# Patient Record
Sex: FEMALE | Race: WHITE
[De-identification: names, ages, dates, MRNs, and addresses within clinical notes are randomized per-mention and may not be internally consistent; named-entity substitution may affect disease eponyms.]

---

## 2017-03-01 ENCOUNTER — HOSPITAL ENCOUNTER (OUTPATIENT)
Age: 72
End: 2017-03-01
Payer: COMMERCIAL

## 2017-03-01 DIAGNOSIS — M85.9: Primary | ICD-10-CM

## 2017-03-01 DIAGNOSIS — C50.919: ICD-10-CM

## 2017-11-29 ENCOUNTER — TELEPHONE (OUTPATIENT)
Dept: NEUROLOGY | Facility: CLINIC | Age: 72
End: 2017-11-29

## 2022-02-18 ENCOUNTER — HOSPITAL ENCOUNTER (OUTPATIENT)
Dept: CT IMAGING | Facility: HOSPITAL | Age: 77
Discharge: HOME | End: 2022-02-18
Payer: MEDICARE

## 2022-02-18 DIAGNOSIS — T85.43XS BREAST IMPLANT RUPTURE, SEQUELA: ICD-10-CM

## 2022-02-18 PROCEDURE — 71250 CT THORAX DX C-: CPT

## 2023-06-06 ENCOUNTER — APPOINTMENT (OUTPATIENT)
Dept: GENERAL RADIOLOGY | Facility: HOSPITAL | Age: 78
DRG: 193 | End: 2023-06-06
Attending: EMERGENCY MEDICINE
Payer: MEDICARE

## 2023-06-06 ENCOUNTER — HOSPITAL ENCOUNTER (INPATIENT)
Facility: HOSPITAL | Age: 78
LOS: 3 days | Discharge: NURSING, REHAB OR LONG-TERM CARE | DRG: 193 | End: 2023-06-09
Attending: EMERGENCY MEDICINE | Admitting: INTERNAL MEDICINE
Payer: MEDICARE

## 2023-06-06 DIAGNOSIS — D72.829 LEUKOCYTOSIS, UNSPECIFIED TYPE: ICD-10-CM

## 2023-06-06 DIAGNOSIS — R09.02 HYPOXIA: Primary | ICD-10-CM

## 2023-06-06 DIAGNOSIS — R09.89 PULMONARY VASCULAR CONGESTION: ICD-10-CM

## 2023-06-06 PROBLEM — I50.43 CHF (CONGESTIVE HEART FAILURE), NYHA CLASS I, ACUTE ON CHRONIC, COMBINED (HCC): Status: ACTIVE | Noted: 2023-06-06

## 2023-06-06 LAB
ALBUMIN SERPL-MCNC: 2.4 G/DL (ref 3.4–4.8)
ALBUMIN/GLOB SERPL: 0.6 {RATIO} (ref 0.8–2)
ALP SERPL-CCNC: 218 U/L (ref 25–100)
ALT SERPL-CCNC: 30 U/L (ref 4–36)
AMORPH SED URNS QL MICRO: ABNORMAL /HPF
ANION GAP SERPL CALCULATED.3IONS-SCNC: 10 MMOL/L (ref 3–16)
AST SERPL-CCNC: 35 U/L (ref 8–33)
BACTERIA URNS QL MICRO: ABNORMAL /HPF
BASE EXCESS BLDA CALC-SCNC: 7 MMOL/L (ref -3–3)
BASOPHILS # BLD: 0.1 K/UL (ref 0–0.1)
BASOPHILS NFR BLD: 0.5 %
BILIRUB SERPL-MCNC: 0.3 MG/DL (ref 0.3–1.2)
BILIRUB UR QL STRIP.AUTO: ABNORMAL
BUN SERPL-MCNC: 22 MG/DL (ref 6–20)
CALCIUM SERPL-MCNC: 9.3 MG/DL (ref 8.5–10.5)
CHLORIDE SERPL-SCNC: 96 MMOL/L (ref 98–107)
CLARITY UR: CLEAR
CO2 BLDA-SCNC: 34.2 MMOL/L (ref 24–30)
CO2 SERPL-SCNC: 28 MMOL/L (ref 20–30)
COARSE GRAN CASTS #/AREA URNS LPF: ABNORMAL /LPF (ref 0–2)
COLOR UR: YELLOW
CREAT SERPL-MCNC: 0.8 MG/DL (ref 0.4–1.2)
EOSINOPHIL # BLD: 0 K/UL (ref 0–0.4)
EOSINOPHIL NFR BLD: 0 %
EPI CELLS #/AREA URNS HPF: ABNORMAL /HPF (ref 0–5)
ERYTHROCYTE [DISTWIDTH] IN BLOOD BY AUTOMATED COUNT: 12.9 % (ref 11–16)
GFR SERPLBLD CREATININE-BSD FMLA CKD-EPI: >60 ML/MIN/{1.73_M2}
GLOBULIN SER CALC-MCNC: 4.3 G/DL
GLUCOSE SERPL-MCNC: 101 MG/DL (ref 74–106)
GLUCOSE UR STRIP.AUTO-MCNC: NEGATIVE MG/DL
HCO3 BLDA-SCNC: 32.7 MMOL/L (ref 22–26)
HCT VFR BLD AUTO: 44.8 % (ref 37–47)
HGB BLD-MCNC: 14.5 G/DL (ref 11.5–16.5)
HGB UR QL STRIP.AUTO: ABNORMAL
IMM GRANULOCYTES # BLD: 0.3 K/UL
IMM GRANULOCYTES NFR BLD: 1.3 % (ref 0–5)
INHALED O2 FLOW RATE: 0.21 %
KETONES UR STRIP.AUTO-MCNC: 15 MG/DL
LACTATE BLDV-SCNC: 2 MMOL/L (ref 0.4–2)
LEUKOCYTE ESTERASE UR QL STRIP.AUTO: NEGATIVE
LYMPHOCYTES # BLD: 1.3 K/UL (ref 1.5–4)
LYMPHOCYTES NFR BLD: 5.8 %
MCH RBC QN AUTO: 32.6 PG (ref 27–32)
MCHC RBC AUTO-ENTMCNC: 32.4 G/DL (ref 31–35)
MCV RBC AUTO: 100.7 FL (ref 80–100)
MONOCYTES # BLD: 1.5 K/UL (ref 0.2–0.8)
MONOCYTES NFR BLD: 6.8 %
MUCOUS THREADS URNS QL MICRO: ABNORMAL /LPF
NEUTROPHILS # BLD: 18.8 K/UL (ref 2–7.5)
NEUTS SEG NFR BLD: 85.6 %
NITRITE UR QL STRIP.AUTO: NEGATIVE
NT-PROBNP SERPL-MCNC: 701 PG/ML (ref 0–1800)
O2 THERAPY: ABNORMAL
PCO2 BLDA: 50.4 MMHG (ref 35–45)
PH BLDA: 7.43 [PH] (ref 7.35–7.45)
PH UR STRIP.AUTO: 5.5 [PH] (ref 5–8)
PLATELET # BLD AUTO: 222 K/UL (ref 150–400)
PMV BLD AUTO: 9.9 FL (ref 6–10)
PO2 BLDA: 49.8 MMHG (ref 80–100)
POTASSIUM SERPL-SCNC: 4.6 MMOL/L (ref 3.4–5.1)
PROT SERPL-MCNC: 6.7 G/DL (ref 6.4–8.3)
PROT UR STRIP.AUTO-MCNC: 100 MG/DL
RBC # BLD AUTO: 4.45 M/UL (ref 3.8–5.8)
RBC #/AREA URNS HPF: ABNORMAL /HPF (ref 0–4)
SAO2 % BLDA: 82.4 %
SODIUM SERPL-SCNC: 134 MMOL/L (ref 136–145)
SP GR UR STRIP.AUTO: >=1.03 (ref 1–1.03)
T4 FREE SERPL-MCNC: 0.8 NG/DL (ref 0.89–1.76)
TSH SERPL-MCNC: 8.16 UIU/ML (ref 0.27–4.2)
UA COMPLETE W REFLEX CULTURE PNL UR: ABNORMAL
UA DIPSTICK W REFLEX MICRO PNL UR: YES
URN SPEC COLLECT METH UR: ABNORMAL
UROBILINOGEN UR STRIP-ACNC: 2 E.U./DL
WBC # BLD AUTO: 21.9 K/UL (ref 4–11)
WBC #/AREA URNS HPF: ABNORMAL /HPF (ref 0–5)

## 2023-06-06 PROCEDURE — 93005 ELECTROCARDIOGRAM TRACING: CPT

## 2023-06-06 PROCEDURE — 84443 ASSAY THYROID STIM HORMONE: CPT

## 2023-06-06 PROCEDURE — 80053 COMPREHEN METABOLIC PANEL: CPT

## 2023-06-06 PROCEDURE — 81001 URINALYSIS AUTO W/SCOPE: CPT

## 2023-06-06 PROCEDURE — 83605 ASSAY OF LACTIC ACID: CPT

## 2023-06-06 PROCEDURE — 87040 BLOOD CULTURE FOR BACTERIA: CPT

## 2023-06-06 PROCEDURE — 6360000002 HC RX W HCPCS: Performed by: FAMILY MEDICINE

## 2023-06-06 PROCEDURE — 82803 BLOOD GASES ANY COMBINATION: CPT

## 2023-06-06 PROCEDURE — 6370000000 HC RX 637 (ALT 250 FOR IP): Performed by: EMERGENCY MEDICINE

## 2023-06-06 PROCEDURE — 71045 X-RAY EXAM CHEST 1 VIEW: CPT

## 2023-06-06 PROCEDURE — 1200000000 HC SEMI PRIVATE

## 2023-06-06 PROCEDURE — 85025 COMPLETE CBC W/AUTO DIFF WBC: CPT

## 2023-06-06 PROCEDURE — 51701 INSERT BLADDER CATHETER: CPT

## 2023-06-06 PROCEDURE — 36600 WITHDRAWAL OF ARTERIAL BLOOD: CPT

## 2023-06-06 PROCEDURE — 36415 COLL VENOUS BLD VENIPUNCTURE: CPT

## 2023-06-06 PROCEDURE — 83880 ASSAY OF NATRIURETIC PEPTIDE: CPT

## 2023-06-06 PROCEDURE — 84439 ASSAY OF FREE THYROXINE: CPT

## 2023-06-06 PROCEDURE — 96374 THER/PROPH/DIAG INJ IV PUSH: CPT

## 2023-06-06 PROCEDURE — 99285 EMERGENCY DEPT VISIT HI MDM: CPT

## 2023-06-06 PROCEDURE — 94640 AIRWAY INHALATION TREATMENT: CPT

## 2023-06-06 RX ORDER — ENOXAPARIN SODIUM 100 MG/ML
40 INJECTION SUBCUTANEOUS DAILY
Status: DISCONTINUED | OUTPATIENT
Start: 2023-06-07 | End: 2023-06-09 | Stop reason: HOSPADM

## 2023-06-06 RX ORDER — DONEPEZIL HYDROCHLORIDE 5 MG/1
10 TABLET, FILM COATED ORAL NIGHTLY
Status: DISCONTINUED | OUTPATIENT
Start: 2023-06-07 | End: 2023-06-09 | Stop reason: HOSPADM

## 2023-06-06 RX ORDER — IPRATROPIUM BROMIDE AND ALBUTEROL SULFATE 2.5; .5 MG/3ML; MG/3ML
1 SOLUTION RESPIRATORY (INHALATION) ONCE
Status: COMPLETED | OUTPATIENT
Start: 2023-06-06 | End: 2023-06-06

## 2023-06-06 RX ORDER — FLUOXETINE HYDROCHLORIDE 40 MG/1
40 CAPSULE ORAL DAILY
COMMUNITY

## 2023-06-06 RX ORDER — DONEPEZIL HYDROCHLORIDE 10 MG/1
10 TABLET, FILM COATED ORAL NIGHTLY
COMMUNITY

## 2023-06-06 RX ORDER — POLYETHYLENE GLYCOL 3350 17 G/17G
17 POWDER, FOR SOLUTION ORAL DAILY
COMMUNITY

## 2023-06-06 RX ORDER — SODIUM CHLORIDE 9 MG/ML
INJECTION, SOLUTION INTRAVENOUS PRN
Status: DISCONTINUED | OUTPATIENT
Start: 2023-06-06 | End: 2023-06-09 | Stop reason: HOSPADM

## 2023-06-06 RX ORDER — FUROSEMIDE 10 MG/ML
20 INJECTION INTRAMUSCULAR; INTRAVENOUS ONCE
Status: COMPLETED | OUTPATIENT
Start: 2023-06-06 | End: 2023-06-06

## 2023-06-06 RX ORDER — DIVALPROEX SODIUM 500 MG/1
500 TABLET, DELAYED RELEASE ORAL 2 TIMES DAILY
Status: DISCONTINUED | OUTPATIENT
Start: 2023-06-07 | End: 2023-06-07

## 2023-06-06 RX ORDER — FLUOXETINE HYDROCHLORIDE 20 MG/1
40 CAPSULE ORAL DAILY
Status: DISCONTINUED | OUTPATIENT
Start: 2023-06-07 | End: 2023-06-09 | Stop reason: HOSPADM

## 2023-06-06 RX ORDER — FERROUS SULFATE 325(65) MG
325 TABLET ORAL 2 TIMES DAILY
COMMUNITY

## 2023-06-06 RX ORDER — ACETAMINOPHEN 325 MG/1
650 TABLET ORAL EVERY 6 HOURS PRN
Status: DISCONTINUED | OUTPATIENT
Start: 2023-06-06 | End: 2023-06-07 | Stop reason: SDUPTHER

## 2023-06-06 RX ORDER — ACETAMINOPHEN 650 MG/1
650 SUPPOSITORY RECTAL EVERY 6 HOURS PRN
Status: DISCONTINUED | OUTPATIENT
Start: 2023-06-06 | End: 2023-06-09 | Stop reason: HOSPADM

## 2023-06-06 RX ORDER — ONDANSETRON 2 MG/ML
4 INJECTION INTRAMUSCULAR; INTRAVENOUS EVERY 6 HOURS PRN
Status: DISCONTINUED | OUTPATIENT
Start: 2023-06-06 | End: 2023-06-09 | Stop reason: HOSPADM

## 2023-06-06 RX ORDER — FLUTICASONE PROPIONATE 50 MCG
1 SPRAY, SUSPENSION (ML) NASAL DAILY
Status: DISCONTINUED | OUTPATIENT
Start: 2023-06-07 | End: 2023-06-09 | Stop reason: HOSPADM

## 2023-06-06 RX ORDER — ROSUVASTATIN CALCIUM 20 MG/1
20 TABLET, COATED ORAL DAILY
Status: DISCONTINUED | OUTPATIENT
Start: 2023-06-07 | End: 2023-06-09 | Stop reason: HOSPADM

## 2023-06-06 RX ORDER — MEMANTINE HYDROCHLORIDE 10 MG/1
10 TABLET ORAL 2 TIMES DAILY
COMMUNITY

## 2023-06-06 RX ORDER — LEVOTHYROXINE SODIUM 0.05 MG/1
50 TABLET ORAL DAILY
Status: ON HOLD | COMMUNITY
End: 2023-06-09 | Stop reason: SDUPTHER

## 2023-06-06 RX ORDER — POLYETHYLENE GLYCOL 3350 17 G/17G
17 POWDER, FOR SOLUTION ORAL DAILY
Status: DISCONTINUED | OUTPATIENT
Start: 2023-06-07 | End: 2023-06-09 | Stop reason: HOSPADM

## 2023-06-06 RX ORDER — FERROUS SULFATE TAB EC 324 MG (65 MG FE EQUIVALENT) 324 (65 FE) MG
324 TABLET DELAYED RESPONSE ORAL 2 TIMES DAILY
Status: DISCONTINUED | OUTPATIENT
Start: 2023-06-07 | End: 2023-06-07 | Stop reason: CLARIF

## 2023-06-06 RX ORDER — POLYETHYLENE GLYCOL 3350 17 G/17G
17 POWDER, FOR SOLUTION ORAL DAILY PRN
Status: DISCONTINUED | OUTPATIENT
Start: 2023-06-06 | End: 2023-06-09 | Stop reason: HOSPADM

## 2023-06-06 RX ORDER — VITAMIN B COMPLEX
1000 TABLET ORAL DAILY
Status: DISCONTINUED | OUTPATIENT
Start: 2023-06-07 | End: 2023-06-09 | Stop reason: HOSPADM

## 2023-06-06 RX ORDER — M-VIT,TX,IRON,MINS/CALC/FOLIC 27MG-0.4MG
1 TABLET ORAL DAILY
Status: DISCONTINUED | OUTPATIENT
Start: 2023-06-07 | End: 2023-06-07 | Stop reason: CLARIF

## 2023-06-06 RX ORDER — ACETAMINOPHEN 325 MG/1
650 TABLET ORAL EVERY 6 HOURS PRN
COMMUNITY

## 2023-06-06 RX ORDER — FLUTICASONE PROPIONATE 50 MCG
1 SPRAY, SUSPENSION (ML) NASAL NIGHTLY
COMMUNITY

## 2023-06-06 RX ORDER — ONDANSETRON 4 MG/1
4 TABLET, ORALLY DISINTEGRATING ORAL EVERY 8 HOURS PRN
Status: DISCONTINUED | OUTPATIENT
Start: 2023-06-06 | End: 2023-06-09 | Stop reason: HOSPADM

## 2023-06-06 RX ORDER — SODIUM CHLORIDE 0.9 % (FLUSH) 0.9 %
5-40 SYRINGE (ML) INJECTION EVERY 12 HOURS SCHEDULED
Status: DISCONTINUED | OUTPATIENT
Start: 2023-06-07 | End: 2023-06-09 | Stop reason: HOSPADM

## 2023-06-06 RX ORDER — AZITHROMYCIN 250 MG/1
500 TABLET, FILM COATED ORAL EVERY 24 HOURS
Status: COMPLETED | OUTPATIENT
Start: 2023-06-06 | End: 2023-06-09

## 2023-06-06 RX ORDER — PANTOPRAZOLE SODIUM 40 MG/1
40 TABLET, DELAYED RELEASE ORAL DAILY
Status: DISCONTINUED | OUTPATIENT
Start: 2023-06-07 | End: 2023-06-07 | Stop reason: CLARIF

## 2023-06-06 RX ORDER — LEVOTHYROXINE SODIUM 0.07 MG/1
75 TABLET ORAL DAILY
Status: DISCONTINUED | OUTPATIENT
Start: 2023-06-07 | End: 2023-06-09 | Stop reason: HOSPADM

## 2023-06-06 RX ORDER — SODIUM CHLORIDE 0.9 % (FLUSH) 0.9 %
5-40 SYRINGE (ML) INJECTION PRN
Status: DISCONTINUED | OUTPATIENT
Start: 2023-06-06 | End: 2023-06-09 | Stop reason: HOSPADM

## 2023-06-06 RX ORDER — ACETAMINOPHEN 325 MG/1
650 TABLET ORAL EVERY 6 HOURS PRN
Status: DISCONTINUED | OUTPATIENT
Start: 2023-06-06 | End: 2023-06-09 | Stop reason: HOSPADM

## 2023-06-06 RX ORDER — MEMANTINE HYDROCHLORIDE 5 MG/1
10 TABLET ORAL 2 TIMES DAILY
Status: DISCONTINUED | OUTPATIENT
Start: 2023-06-07 | End: 2023-06-09 | Stop reason: HOSPADM

## 2023-06-06 RX ORDER — PANTOPRAZOLE SODIUM 40 MG/1
40 TABLET, DELAYED RELEASE ORAL DAILY
COMMUNITY

## 2023-06-06 RX ORDER — DIVALPROEX SODIUM 500 MG/1
500 TABLET, DELAYED RELEASE ORAL 2 TIMES DAILY
COMMUNITY

## 2023-06-06 RX ADMIN — FUROSEMIDE 20 MG: 10 INJECTION, SOLUTION INTRAMUSCULAR; INTRAVENOUS at 20:34

## 2023-06-06 RX ADMIN — IPRATROPIUM BROMIDE AND ALBUTEROL SULFATE 1 DOSE: .5; 3 SOLUTION RESPIRATORY (INHALATION) at 18:55

## 2023-06-06 ASSESSMENT — PAIN - FUNCTIONAL ASSESSMENT: PAIN_FUNCTIONAL_ASSESSMENT: NONE - DENIES PAIN

## 2023-06-06 NOTE — ED NOTES
Upon assessment for IV access. Pt noted to have numerous puncture wounds with bruising appear to me anatomically over vein sites in UE, that were present on arrival. >14 site.        Beth Elliott RN  06/06/23 1962

## 2023-06-06 NOTE — ED TRIAGE NOTES
Pt arrive via [facility], from [facility] for [facility]. Pt is alert upon arrival, pleasantly confused, history of dementia, usually able to do things mostly on her own per reports. Has had a decline and increase weakness x2-3 days. Report from NH, advised O2 this AM was 82% RA, was placed on 2LNC, and later in day had to increase to 3L to maintain 90%.  BP 97/53

## 2023-06-06 NOTE — ED PROVIDER NOTES
Yulissa Avitia [address]      Pt Name: Tina Reeder  MRN: [id number]  [place]: 1945  Date of evaluation: 6/6/2023  Provider: Hung Pierson MD    [address] [place] [address]       Chief Complaint   Patient presents with    Altered Mental Status         HISTORY OF PRESENT ILLNESS  (Location/Symptom, Timing/Onset, Context/Setting, Quality, Duration, Modifying Factors, Severity.)   Tina Redeer is a 69 y.o. female who presents to the emergency department patient is alert oriented only to self history per the triage nurse the patient was sent from [address] [place] [address] with a state over the last 3 to 4 days she has gotten more more lethargic with decreased functioning and said normally she is disoriented but she is able to get up move around and function and she is been doing that less and less they also noted that her oxygen saturation has been down to 89% she is normally on 2 L nasal cannula oxygen they had to turn it up to 3. Nursing notes were reviewed. REVIEW OFSYSTEMS    (2-9 systems for level 4, 10 or more for level 5)   ROS:  Unobtainable secondary to patient's mental status    PAST MEDICAL HISTORY     Past Medical History:   Diagnosis Date    Dementia ([facility] [address]     Depression     GERD (gastroesophageal reflux disease)     Hyperlipidemia     Hypertension     Thyroid disease          SURGICAL HISTORY     History reviewed. No pertinent surgical history.       CURRENT MEDICATIONS       Previous Medications    ACETAMINOPHEN (TYLENOL) 325 MG TABLET    Take 2 tablets by mouth every 6 hours as needed for Pain    APOAEQUORIN (PREVAGEN) 10 MG CAPS    Take by mouth    DIVALPROEX (DEPAKOTE) 500 MG DR TABLET    Take 1 tablet by mouth in the morning and at bedtime    DONEPEZIL (ARICEPT) 10 MG TABLET    1 tablet nightly    FERROUS SULFATE (IRON 325) 325 (65 FE) MG TABLET    Take 1 tablet by mouth in the morning and at bedtime    FLUOXETINE (PROZAC) 40 MG

## 2023-06-06 NOTE — ED NOTES
Attempted to draw add on labs on this patient, stuck her twice and was not able to get more than a flash. Reported this to the Nurse and asked Varsha PCA to try.      Elyse Vincent  06/06/23 1916

## 2023-06-07 ENCOUNTER — APPOINTMENT (OUTPATIENT)
Dept: CT IMAGING | Facility: HOSPITAL | Age: 78
DRG: 193 | End: 2023-06-07
Payer: MEDICARE

## 2023-06-07 ENCOUNTER — OUTSIDE FACILITY SERVICE (OUTPATIENT)
Dept: CARDIOLOGY | Facility: CLINIC | Age: 78
End: 2023-06-07
Payer: MEDICARE

## 2023-06-07 PROBLEM — J96.01 ACUTE RESPIRATORY FAILURE WITH HYPOXIA AND HYPERCAPNIA (HCC): Status: ACTIVE | Noted: 2023-06-07

## 2023-06-07 PROBLEM — J96.02 ACUTE RESPIRATORY FAILURE WITH HYPOXIA AND HYPERCAPNIA (HCC): Status: ACTIVE | Noted: 2023-06-07

## 2023-06-07 PROBLEM — R13.10 DYSPHAGIA: Status: ACTIVE | Noted: 2023-06-07

## 2023-06-07 PROBLEM — J18.9 PNEUMONIA: Status: ACTIVE | Noted: 2023-06-07

## 2023-06-07 LAB
ANION GAP SERPL CALCULATED.3IONS-SCNC: 10 MMOL/L (ref 3–16)
BASE EXCESS BLDA CALC-SCNC: 9.5 MMOL/L (ref -3–3)
BASOPHILS # BLD: 0.1 K/UL (ref 0–0.1)
BASOPHILS NFR BLD: 0.4 %
BUN SERPL-MCNC: 20 MG/DL (ref 6–20)
CALCIUM SERPL-MCNC: 9.1 MG/DL (ref 8.5–10.5)
CHLORIDE SERPL-SCNC: 94 MMOL/L (ref 98–107)
CK SERPL-CCNC: 77 U/L (ref 26–174)
CO2 BLDA-SCNC: 36.8 MMOL/L (ref 24–30)
CO2 SERPL-SCNC: 33 MMOL/L (ref 20–30)
CREAT SERPL-MCNC: 0.7 MG/DL (ref 0.4–1.2)
EOSINOPHIL # BLD: 0 K/UL (ref 0–0.4)
EOSINOPHIL NFR BLD: 0 %
ERYTHROCYTE [DISTWIDTH] IN BLOOD BY AUTOMATED COUNT: 12.7 % (ref 11–16)
FOLATE SERPL-MCNC: 16.73 NG/ML
GFR SERPLBLD CREATININE-BSD FMLA CKD-EPI: >60 ML/MIN/{1.73_M2}
GLUCOSE SERPL-MCNC: 97 MG/DL (ref 74–106)
HCO3 BLDA-SCNC: 35.2 MMOL/L (ref 22–26)
HCT VFR BLD AUTO: 38.8 % (ref 37–47)
HGB BLD-MCNC: 12.6 G/DL (ref 11.5–16.5)
IMM GRANULOCYTES # BLD: 0.3 K/UL
IMM GRANULOCYTES NFR BLD: 1.3 % (ref 0–5)
INHALED O2 FLOW RATE: 0.28 %
LV EF: 58 %
LVEF MODALITY: NORMAL
LYMPHOCYTES # BLD: 1.4 K/UL (ref 1.5–4)
LYMPHOCYTES NFR BLD: 5.8 %
MAGNESIUM SERPL-MCNC: 1.7 MG/DL (ref 1.7–2.4)
MCH RBC QN AUTO: 32.1 PG (ref 27–32)
MCHC RBC AUTO-ENTMCNC: 32.5 G/DL (ref 31–35)
MCV RBC AUTO: 98.7 FL (ref 80–100)
MONOCYTES # BLD: 1.6 K/UL (ref 0.2–0.8)
MONOCYTES NFR BLD: 6.8 %
NEUTROPHILS # BLD: 20.7 K/UL (ref 2–7.5)
NEUTS SEG NFR BLD: 85.7 %
O2 THERAPY: ABNORMAL
PCO2 BLDA: 51 MMHG (ref 35–45)
PH BLDA: 7.46 [PH] (ref 7.35–7.45)
PLATELET # BLD AUTO: 208 K/UL (ref 150–400)
PMV BLD AUTO: 10.3 FL (ref 6–10)
PO2 BLDA: 68.8 MMHG (ref 80–100)
POTASSIUM SERPL-SCNC: 2.8 MMOL/L (ref 3.4–5.1)
PROCALCITONIN SERPL IA-MCNC: 0.2 NG/ML (ref 0–0.15)
RBC # BLD AUTO: 3.93 M/UL (ref 3.8–5.8)
SAO2 % BLDA: 93.5 %
SODIUM SERPL-SCNC: 137 MMOL/L (ref 136–145)
VIT B12 SERPL-MCNC: >2000 PG/ML (ref 211–911)
WBC # BLD AUTO: 24.1 K/UL (ref 4–11)

## 2023-06-07 PROCEDURE — 1200000000 HC SEMI PRIVATE

## 2023-06-07 PROCEDURE — 2580000003 HC RX 258: Performed by: INTERNAL MEDICINE

## 2023-06-07 PROCEDURE — 6370000000 HC RX 637 (ALT 250 FOR IP): Performed by: PHYSICIAN ASSISTANT

## 2023-06-07 PROCEDURE — 6360000002 HC RX W HCPCS: Performed by: PHYSICIAN ASSISTANT

## 2023-06-07 PROCEDURE — 93306 TTE W/DOPPLER COMPLETE: CPT

## 2023-06-07 PROCEDURE — 97802 MEDICAL NUTRITION INDIV IN: CPT

## 2023-06-07 PROCEDURE — 84145 PROCALCITONIN (PCT): CPT

## 2023-06-07 PROCEDURE — 6370000000 HC RX 637 (ALT 250 FOR IP): Performed by: INTERNAL MEDICINE

## 2023-06-07 PROCEDURE — 85025 COMPLETE CBC W/AUTO DIFF WBC: CPT

## 2023-06-07 PROCEDURE — 97530 THERAPEUTIC ACTIVITIES: CPT

## 2023-06-07 PROCEDURE — 92610 EVALUATE SWALLOWING FUNCTION: CPT

## 2023-06-07 PROCEDURE — 82550 ASSAY OF CK (CPK): CPT

## 2023-06-07 PROCEDURE — 97165 OT EVAL LOW COMPLEX 30 MIN: CPT

## 2023-06-07 PROCEDURE — 82803 BLOOD GASES ANY COMBINATION: CPT

## 2023-06-07 PROCEDURE — 71250 CT THORAX DX C-: CPT

## 2023-06-07 PROCEDURE — 82607 VITAMIN B-12: CPT

## 2023-06-07 PROCEDURE — 97161 PT EVAL LOW COMPLEX 20 MIN: CPT

## 2023-06-07 PROCEDURE — 83735 ASSAY OF MAGNESIUM: CPT

## 2023-06-07 PROCEDURE — 70450 CT HEAD/BRAIN W/O DYE: CPT

## 2023-06-07 PROCEDURE — 82746 ASSAY OF FOLIC ACID SERUM: CPT

## 2023-06-07 PROCEDURE — 6360000002 HC RX W HCPCS: Performed by: INTERNAL MEDICINE

## 2023-06-07 PROCEDURE — 36600 WITHDRAWAL OF ARTERIAL BLOOD: CPT

## 2023-06-07 PROCEDURE — 80048 BASIC METABOLIC PNL TOTAL CA: CPT

## 2023-06-07 PROCEDURE — 2700000000 HC OXYGEN THERAPY PER DAY

## 2023-06-07 PROCEDURE — 2580000003 HC RX 258: Performed by: PHYSICIAN ASSISTANT

## 2023-06-07 PROCEDURE — 99222 1ST HOSP IP/OBS MODERATE 55: CPT | Performed by: PHYSICIAN ASSISTANT

## 2023-06-07 PROCEDURE — 94640 AIRWAY INHALATION TREATMENT: CPT

## 2023-06-07 PROCEDURE — 36415 COLL VENOUS BLD VENIPUNCTURE: CPT

## 2023-06-07 RX ORDER — GUAIFENESIN 200 MG/10ML
200 LIQUID ORAL EVERY 6 HOURS PRN
Status: ON HOLD | COMMUNITY
End: 2023-06-09 | Stop reason: HOSPADM

## 2023-06-07 RX ORDER — DIVALPROEX SODIUM 125 MG/1
500 CAPSULE, COATED PELLETS ORAL EVERY 12 HOURS SCHEDULED
Status: DISCONTINUED | OUTPATIENT
Start: 2023-06-07 | End: 2023-06-07

## 2023-06-07 RX ORDER — POLYMYXIN B SULFATE AND TRIMETHOPRIM 1; 10000 MG/ML; [USP'U]/ML
1 SOLUTION OPHTHALMIC
Status: DISCONTINUED | OUTPATIENT
Start: 2023-06-07 | End: 2023-06-07 | Stop reason: ALTCHOICE

## 2023-06-07 RX ORDER — DIVALPROEX SODIUM 125 MG/1
500 CAPSULE, COATED PELLETS ORAL EVERY 12 HOURS SCHEDULED
Status: DISCONTINUED | OUTPATIENT
Start: 2023-06-07 | End: 2023-06-09 | Stop reason: HOSPADM

## 2023-06-07 RX ORDER — POTASSIUM CHLORIDE 20 MEQ/1
40 TABLET, EXTENDED RELEASE ORAL ONCE
Status: COMPLETED | OUTPATIENT
Start: 2023-06-07 | End: 2023-06-07

## 2023-06-07 RX ORDER — POTASSIUM CHLORIDE 20 MEQ/1
40 TABLET, EXTENDED RELEASE ORAL ONCE
Status: DISCONTINUED | OUTPATIENT
Start: 2023-06-07 | End: 2023-06-07 | Stop reason: CLARIF

## 2023-06-07 RX ORDER — LANSOPRAZOLE 30 MG/1
30 TABLET, ORALLY DISINTEGRATING, DELAYED RELEASE ORAL
Status: DISCONTINUED | OUTPATIENT
Start: 2023-06-08 | End: 2023-06-09 | Stop reason: HOSPADM

## 2023-06-07 RX ORDER — FUROSEMIDE 20 MG/1
20 TABLET ORAL ONCE
Status: COMPLETED | OUTPATIENT
Start: 2023-06-07 | End: 2023-06-07

## 2023-06-07 RX ORDER — FERROUS SULFATE 300 MG/5ML
300 LIQUID (ML) ORAL DAILY
Status: DISCONTINUED | OUTPATIENT
Start: 2023-06-08 | End: 2023-06-08

## 2023-06-07 RX ORDER — MAGNESIUM SULFATE 1 G/100ML
1000 INJECTION INTRAVENOUS ONCE
Status: COMPLETED | OUTPATIENT
Start: 2023-06-07 | End: 2023-06-07

## 2023-06-07 RX ORDER — POTASSIUM CHLORIDE 7.45 MG/ML
10 INJECTION INTRAVENOUS
Status: COMPLETED | OUTPATIENT
Start: 2023-06-07 | End: 2023-06-07

## 2023-06-07 RX ORDER — GENTAMICIN SULFATE 3 MG/ML
1 SOLUTION/ DROPS OPHTHALMIC
Status: DISCONTINUED | OUTPATIENT
Start: 2023-06-07 | End: 2023-06-09 | Stop reason: HOSPADM

## 2023-06-07 RX ORDER — IPRATROPIUM BROMIDE AND ALBUTEROL SULFATE 2.5; .5 MG/3ML; MG/3ML
1 SOLUTION RESPIRATORY (INHALATION)
Status: DISCONTINUED | OUTPATIENT
Start: 2023-06-07 | End: 2023-06-08

## 2023-06-07 RX ORDER — FUROSEMIDE 10 MG/ML
20 INJECTION INTRAMUSCULAR; INTRAVENOUS ONCE
Status: DISCONTINUED | OUTPATIENT
Start: 2023-06-07 | End: 2023-06-07

## 2023-06-07 RX ORDER — MULTIVIT-MIN/FERROUS GLUCONATE 9 MG/15 ML
15 LIQUID (ML) ORAL DAILY
Status: DISCONTINUED | OUTPATIENT
Start: 2023-06-08 | End: 2023-06-09 | Stop reason: HOSPADM

## 2023-06-07 RX ORDER — POTASSIUM CHLORIDE 750 MG/1
40 CAPSULE, EXTENDED RELEASE ORAL ONCE
Status: COMPLETED | OUTPATIENT
Start: 2023-06-07 | End: 2023-06-07

## 2023-06-07 RX ADMIN — ENOXAPARIN SODIUM 40 MG: 100 INJECTION SUBCUTANEOUS at 08:37

## 2023-06-07 RX ADMIN — POTASSIUM CHLORIDE 10 MEQ: 7.46 INJECTION, SOLUTION INTRAVENOUS at 08:47

## 2023-06-07 RX ADMIN — MEMANTINE HYDROCHLORIDE 10 MG: 5 TABLET, FILM COATED ORAL at 08:36

## 2023-06-07 RX ADMIN — SODIUM CHLORIDE: 9 INJECTION, SOLUTION INTRAVENOUS at 00:45

## 2023-06-07 RX ADMIN — POLYETHYLENE GLYCOL 3350 17 G: 17 POWDER, FOR SOLUTION ORAL at 08:35

## 2023-06-07 RX ADMIN — FLUOXETINE 40 MG: 20 CAPSULE ORAL at 08:36

## 2023-06-07 RX ADMIN — POTASSIUM CHLORIDE 40 MEQ: 1500 TABLET, EXTENDED RELEASE ORAL at 06:59

## 2023-06-07 RX ADMIN — POTASSIUM CHLORIDE 40 MEQ: 10 CAPSULE, COATED, EXTENDED RELEASE ORAL at 12:49

## 2023-06-07 RX ADMIN — POTASSIUM CHLORIDE 10 MEQ: 7.46 INJECTION, SOLUTION INTRAVENOUS at 06:58

## 2023-06-07 RX ADMIN — CEFTRIAXONE 1000 MG: 1 INJECTION, POWDER, FOR SOLUTION INTRAMUSCULAR; INTRAVENOUS at 00:47

## 2023-06-07 RX ADMIN — IPRATROPIUM BROMIDE AND ALBUTEROL SULFATE 1 DOSE: .5; 3 SOLUTION RESPIRATORY (INHALATION) at 16:06

## 2023-06-07 RX ADMIN — PANTOPRAZOLE SODIUM 40 MG: 40 TABLET, DELAYED RELEASE ORAL at 08:36

## 2023-06-07 RX ADMIN — MEMANTINE HYDROCHLORIDE 10 MG: 5 TABLET, FILM COATED ORAL at 19:43

## 2023-06-07 RX ADMIN — LEVOTHYROXINE SODIUM 75 MCG: 0.07 TABLET ORAL at 06:59

## 2023-06-07 RX ADMIN — PIPERACILLIN AND TAZOBACTAM 3375 MG: 3; .375 INJECTION, POWDER, FOR SOLUTION INTRAVENOUS at 14:57

## 2023-06-07 RX ADMIN — AZITHROMYCIN MONOHYDRATE 500 MG: 250 TABLET ORAL at 23:29

## 2023-06-07 RX ADMIN — FUROSEMIDE 20 MG: 20 TABLET ORAL at 14:52

## 2023-06-07 RX ADMIN — PIPERACILLIN AND TAZOBACTAM 3375 MG: 3; .375 INJECTION, POWDER, FOR SOLUTION INTRAVENOUS at 21:25

## 2023-06-07 RX ADMIN — FLUTICASONE PROPIONATE 1 SPRAY: 50 SPRAY, METERED NASAL at 08:35

## 2023-06-07 RX ADMIN — GENTAMICIN SULFATE 1 DROP: 3 SOLUTION OPHTHALMIC at 14:08

## 2023-06-07 RX ADMIN — MEMANTINE HYDROCHLORIDE 10 MG: 5 TABLET, FILM COATED ORAL at 00:49

## 2023-06-07 RX ADMIN — Medication 1000 UNITS: at 08:36

## 2023-06-07 RX ADMIN — MULTIPLE VITAMINS W/ MINERALS TAB 1 TABLET: TAB at 08:35

## 2023-06-07 RX ADMIN — DONEPEZIL HYDROCHLORIDE 10 MG: 5 TABLET, FILM COATED ORAL at 00:49

## 2023-06-07 RX ADMIN — FERROUS SULFATE TAB EC 324 MG (65 MG FE EQUIVALENT) 324 MG: 324 (65 FE) TABLET DELAYED RESPONSE at 00:49

## 2023-06-07 RX ADMIN — DIVALPROEX SODIUM 500 MG: 125 CAPSULE, COATED PELLETS ORAL at 10:16

## 2023-06-07 RX ADMIN — DIVALPROEX SODIUM 500 MG: 125 CAPSULE, COATED PELLETS ORAL at 19:43

## 2023-06-07 RX ADMIN — AZITHROMYCIN MONOHYDRATE 500 MG: 250 TABLET ORAL at 00:48

## 2023-06-07 RX ADMIN — DONEPEZIL HYDROCHLORIDE 10 MG: 5 TABLET, FILM COATED ORAL at 19:43

## 2023-06-07 RX ADMIN — FERROUS SULFATE TAB EC 324 MG (65 MG FE EQUIVALENT) 324 MG: 324 (65 FE) TABLET DELAYED RESPONSE at 08:36

## 2023-06-07 RX ADMIN — DIVALPROEX SODIUM 500 MG: 500 TABLET, DELAYED RELEASE ORAL at 00:49

## 2023-06-07 RX ADMIN — GENTAMICIN SULFATE 1 DROP: 3 SOLUTION OPHTHALMIC at 23:29

## 2023-06-07 RX ADMIN — MAGNESIUM SULFATE HEPTAHYDRATE 1000 MG: 1 INJECTION, SOLUTION INTRAVENOUS at 10:17

## 2023-06-07 RX ADMIN — SODIUM CHLORIDE, PRESERVATIVE FREE 10 ML: 5 INJECTION INTRAVENOUS at 19:44

## 2023-06-07 RX ADMIN — ROSUVASTATIN CALCIUM 20 MG: 20 TABLET, FILM COATED ORAL at 08:36

## 2023-06-07 RX ADMIN — GENTAMICIN SULFATE 1 DROP: 3 SOLUTION OPHTHALMIC at 19:51

## 2023-06-07 NOTE — ED NOTES
Spoke with patient at this time to get permission to speak with her son Mirian Fraser at this time as he called for an update     [facility]  06/06/23 7015

## 2023-06-07 NOTE — CARE COORDINATION
Spoke with Ruchi MEJIA at [facility]. States that patient was started on 2 Liters of oxygen continuous on 6/4/2023 following a fall at their facility. States that prior to this patient was mobile and walked around the facility. Also states that patient was alert to self but had been showing increasing confusion prior to the fall, states that patient had been staying in her room more than normal and had become more angry.

## 2023-06-07 NOTE — H&P
[R13.10]  Diet and meds crushed per slp records. Aspiration precautions. 06/07/2023    Dementia ([facility] [address] [F03.90]  Per nursing home at baseline she is oriented to self. Long term resident at [facility]. Continue current regimen. SLP following. Altered mental status [R41.82]  CT head negative. Ph normal on abg. Ck pending. UA negative. Dementia at baseline. Suspect confusion may be related to acute infectious process. Hypothyroidism [E03.9]  Tsh high and synthroid increased to 75 daily. Check tsh in 4-6 weeks. Pleural effusion [J90]  Pleural effusion noted on CXR. No known history of CHF. Hypoxic. BNP around 7000. ECHO pending and prelim report with normal EF. Diuresed with lasix as BP tolerates. CT chest with small left pleural effusion. Wean o2 as tolerated. Monitor fluid balance. Hypokalemia [E87.6]  Monitor and replace as needed      Leukocytosis [D72.829]  Wbc elevated. CT chest obtained with hypoxia, history of breast ca, worsening leukocytosis and no evidence of malignancy but suspicious for RUL infectious process and on antibiotics as below. History of breast cancer [Z85    Pneumonia    CT chest with evidence of infection tree in bud opacities RUL and consolidation concnerning for infectious process. Initially on rocephin and zithromax. With worsening leukocytosis and concern for aspiration dc'd rocephin 6/7 and started zosyn. SLP following. Ellis. Wean o2 as tolerated. Patient was seen and examined with Dr. Cynthia Harden.     KACEY Luz certifies per CMS regulation for 42 CFR [phone]), that the patient may reasonably be expected to be discharged or transferred to a hospital within 96 hours after admission to [address] [place] [address]    Electronically signed by KACEY Luz on 6/7/2023 at 2:23 PM

## 2023-06-07 NOTE — ACP (ADVANCE CARE PLANNING)
Advance Care Planning     General Advance Care Planning (ACP) Conversation    Date of Conversation: 6/7/2023  Conducted with: Patient with Decision Making Capacity    Healthcare Decision Maker:    Primary Decision Maker: Arben Galeano - [phone]  Click here to complete Healthcare Decision Makers including selection of the Healthcare Decision Maker Relationship (ie \"Primary\"). Today we documented Decision Maker(s) consistent with Legal Next of Kin hierarchy.     Content/Action Overview:  Has NO ACP documents/care preferences - information provided, considering goals and options  Reviewed DNR/DNI and patient elects Full Code (Attempt Resuscitation)  artificial nutrition, ventilation preferences, and resuscitation preferences      Length of Voluntary ACP Conversation in minutes:  <16 minutes (Non-Billable)    Leila Townsend

## 2023-06-07 NOTE — CONSULTS
Meds reviewed as pt needs to have meds crushed per provider. Pantoprazole tablet changed to lansoprazole disintegrating tab, multivitamin changed to liquid multivitamin, and depaktote tabs changed to depakote sprinkle capsules. Oral enteric coated iron is currently being help per provider.     Haris Zimmer, EllaD
Requirements Based On: Kcal/kg  Weight Used for Energy Requirements: Current  Energy (kcal/day): 8180-0660 (27-29 kcal/kg)  Weight Used for Protein Requirements: Current  Protein (g/day): 60  Method Used for Fluid Requirements: 1 ml/kcal  Fluid (ml/day): 4750-8497    Nutrition Diagnosis:   Predicted inadequate energy intake related to cognitive or neurological impairment as evidenced by intake 0-25%    Nutrition Interventions:   Food and/or Nutrient Delivery: Continue Current Diet, Start Oral Nutrition Supplement  Nutrition Education/Counseling: Education not appropriate  Coordination of Nutrition Care: Continue to monitor while inpatient  Plan of Care discussed with: Pt    Goals:     Goals: Meet at least 75% of estimated needs       Nutrition Monitoring and Evaluation:   Behavioral-Environmental Outcomes: None Identified  Food/Nutrient Intake Outcomes: Supplement Intake, Food and Nutrient Intake  Physical Signs/Symptoms Outcomes: Biochemical Data, Nutrition Focused Physical Findings, Weight, Chewing or Swallowing, Fluid Status or Edema    Discharge Planning:    Continue Oral Nutrition Supplement, Continue current diet     Aldo Ash RD

## 2023-06-07 NOTE — ED NOTES
Placed patient on RA, patient's O2 sats dropped to 88%. Placed patient on 1LNC, O2 sats 96%.       Emilia Payan RN  06/06/23 2029

## 2023-06-07 NOTE — ED PROVIDER NOTES
10:22 PM EDT  Brynn Miner was checked out to me by Dr. Rosalina Roblero. Please see his/her initial documentation for details of the patient's ED presentation, physical exam and completed studies. In brief, Brynn Miner is a 69 y.o. female that presents to ED for having increased weakness, having low oxygen sat down to mid 80's. She has been declining over past 2-3 days. They ended up placing her on oxygen 2L and increased to 3L. BP was borderline normal 97/53. Pt had work up done before I got here. Brought by [facility].      I have reviewed and interpreted all of the currently available lab results from this visit (if applicable):  Results for orders placed or performed during the hospital encounter of 06/06/23   CBC with Auto Differential   Result Value Ref Range    WBC 21.9 (H) 4.0 - 11.0 K/uL    RBC 4.45 3.80 - 5.80 M/uL    Hemoglobin 14.5 11.5 - 16.5 g/dL    Hematocrit 44.8 37.0 - 47.0 %    .7 (H) 80.0 - 100.0 fL    MCH 32.6 (H) 27.0 - 32.0 pg    MCHC 32.4 31.0 - 35.0 g/dL    RDW 12.9 11.0 - 16.0 %    Platelets 624 846 - 530 K/uL    MPV 9.9 6.0 - 10.0 fL    Neutrophils % 85.6 %    Immature Granulocytes % 1.3 0.0 - 5.0 %    Lymphocytes % 5.8 %    Monocytes % 6.8 %    Eosinophils % 0.0 %    Basophils % 0.5 %    Neutrophils Absolute 18.8 (H) 2.0 - 7.5 K/uL    Immature Granulocytes # 0.3 K/uL    Lymphocytes Absolute 1.3 (L) 1.5 - 4.0 K/uL    Monocytes Absolute 1.5 (H) 0.2 - 0.8 K/uL    Eosinophils Absolute 0.0 0.0 - 0.4 K/uL    Basophils Absolute 0.1 0.0 - 0.1 K/uL   Comprehensive Metabolic Panel   Result Value Ref Range    Sodium 134 (L) 136 - 145 mmol/L    Potassium 4.6 3.4 - 5.1 mmol/L    Chloride 96 (L) 98 - 107 mmol/L    CO2 28 20 - 30 mmol/L    Anion Gap 10 3 - 16    Glucose 101 74 - 106 mg/dL    BUN 22 (H) 6 - 20 mg/dL    Creatinine 0.8 0.4 - 1.2 mg/dL    Est, Glom Filt Rate >60 >59    Calcium 9.3 8.5 - 10.5 mg/dL    Total Protein 6.7 6.4 - 8.3 g/dL    Albumin 2.4 (L) 3.4 - 4.8 g/dL

## 2023-06-08 LAB
25(OH)D3 SERPL-MCNC: 85.3 NG/ML (ref 32–100)
ALBUMIN SERPL-MCNC: 2.1 G/DL (ref 3.4–4.8)
ALBUMIN/GLOB SERPL: 0.6 {RATIO} (ref 0.8–2)
ALP SERPL-CCNC: 170 U/L (ref 25–100)
ALT SERPL-CCNC: 30 U/L (ref 4–36)
ANION GAP SERPL CALCULATED.3IONS-SCNC: 8 MMOL/L (ref 3–16)
AST SERPL-CCNC: 45 U/L (ref 8–33)
BASOPHILS # BLD: 0.1 K/UL (ref 0–0.1)
BASOPHILS NFR BLD: 0.4 %
BILIRUB SERPL-MCNC: 0.3 MG/DL (ref 0.3–1.2)
BUN SERPL-MCNC: 19 MG/DL (ref 6–20)
CALCIUM SERPL-MCNC: 8.8 MG/DL (ref 8.5–10.5)
CHLORIDE SERPL-SCNC: 95 MMOL/L (ref 98–107)
CO2 SERPL-SCNC: 34 MMOL/L (ref 20–30)
CREAT SERPL-MCNC: 0.9 MG/DL (ref 0.4–1.2)
EOSINOPHIL # BLD: 0 K/UL (ref 0–0.4)
EOSINOPHIL NFR BLD: 0.1 %
ERYTHROCYTE [DISTWIDTH] IN BLOOD BY AUTOMATED COUNT: 12.9 % (ref 11–16)
GFR SERPLBLD CREATININE-BSD FMLA CKD-EPI: >60 ML/MIN/{1.73_M2}
GLOBULIN SER CALC-MCNC: 3.8 G/DL
GLUCOSE SERPL-MCNC: 89 MG/DL (ref 74–106)
HCT VFR BLD AUTO: 37.7 % (ref 37–47)
HGB BLD-MCNC: 12.5 G/DL (ref 11.5–16.5)
IMM GRANULOCYTES # BLD: 0.3 K/UL
IMM GRANULOCYTES NFR BLD: 1.4 % (ref 0–5)
LYMPHOCYTES # BLD: 1.2 K/UL (ref 1.5–4)
LYMPHOCYTES NFR BLD: 6.4 %
MAGNESIUM SERPL-MCNC: 1.9 MG/DL (ref 1.7–2.4)
MCH RBC QN AUTO: 32.9 PG (ref 27–32)
MCHC RBC AUTO-ENTMCNC: 33.2 G/DL (ref 31–35)
MCV RBC AUTO: 99.2 FL (ref 80–100)
MONOCYTES # BLD: 1.1 K/UL (ref 0.2–0.8)
MONOCYTES NFR BLD: 5.6 %
NEUTROPHILS # BLD: 16.7 K/UL (ref 2–7.5)
NEUTS SEG NFR BLD: 86.1 %
NT-PROBNP SERPL-MCNC: 543 PG/ML (ref 0–1800)
PLATELET # BLD AUTO: 215 K/UL (ref 150–400)
PMV BLD AUTO: 10 FL (ref 6–10)
POTASSIUM SERPL-SCNC: 3.5 MMOL/L (ref 3.4–5.1)
PROT SERPL-MCNC: 5.9 G/DL (ref 6.4–8.3)
RBC # BLD AUTO: 3.8 M/UL (ref 3.8–5.8)
SODIUM SERPL-SCNC: 137 MMOL/L (ref 136–145)
WBC # BLD AUTO: 19.4 K/UL (ref 4–11)

## 2023-06-08 PROCEDURE — 83735 ASSAY OF MAGNESIUM: CPT

## 2023-06-08 PROCEDURE — 99232 SBSQ HOSP IP/OBS MODERATE 35: CPT | Performed by: PHYSICIAN ASSISTANT

## 2023-06-08 PROCEDURE — 6360000002 HC RX W HCPCS: Performed by: PHYSICIAN ASSISTANT

## 2023-06-08 PROCEDURE — 6370000000 HC RX 637 (ALT 250 FOR IP): Performed by: PHYSICIAN ASSISTANT

## 2023-06-08 PROCEDURE — 97110 THERAPEUTIC EXERCISES: CPT

## 2023-06-08 PROCEDURE — 92526 ORAL FUNCTION THERAPY: CPT

## 2023-06-08 PROCEDURE — 2580000003 HC RX 258: Performed by: INTERNAL MEDICINE

## 2023-06-08 PROCEDURE — 85025 COMPLETE CBC W/AUTO DIFF WBC: CPT

## 2023-06-08 PROCEDURE — 80053 COMPREHEN METABOLIC PANEL: CPT

## 2023-06-08 PROCEDURE — 83880 ASSAY OF NATRIURETIC PEPTIDE: CPT

## 2023-06-08 PROCEDURE — 6360000002 HC RX W HCPCS: Performed by: INTERNAL MEDICINE

## 2023-06-08 PROCEDURE — 2700000000 HC OXYGEN THERAPY PER DAY

## 2023-06-08 PROCEDURE — 94640 AIRWAY INHALATION TREATMENT: CPT

## 2023-06-08 PROCEDURE — 97530 THERAPEUTIC ACTIVITIES: CPT

## 2023-06-08 PROCEDURE — 1200000000 HC SEMI PRIVATE

## 2023-06-08 PROCEDURE — 97535 SELF CARE MNGMENT TRAINING: CPT

## 2023-06-08 PROCEDURE — 82306 VITAMIN D 25 HYDROXY: CPT

## 2023-06-08 PROCEDURE — 94761 N-INVAS EAR/PLS OXIMETRY MLT: CPT

## 2023-06-08 PROCEDURE — 36415 COLL VENOUS BLD VENIPUNCTURE: CPT

## 2023-06-08 PROCEDURE — 6370000000 HC RX 637 (ALT 250 FOR IP): Performed by: INTERNAL MEDICINE

## 2023-06-08 PROCEDURE — 2580000003 HC RX 258: Performed by: PHYSICIAN ASSISTANT

## 2023-06-08 RX ORDER — IPRATROPIUM BROMIDE AND ALBUTEROL SULFATE 2.5; .5 MG/3ML; MG/3ML
1 SOLUTION RESPIRATORY (INHALATION) EVERY 4 HOURS PRN
Status: DISCONTINUED | OUTPATIENT
Start: 2023-06-08 | End: 2023-06-09 | Stop reason: HOSPADM

## 2023-06-08 RX ORDER — FERROUS SULFATE 300 MG/5ML
300 LIQUID (ML) ORAL 2 TIMES DAILY
Status: DISCONTINUED | OUTPATIENT
Start: 2023-06-08 | End: 2023-06-09 | Stop reason: HOSPADM

## 2023-06-08 RX ADMIN — POLYETHYLENE GLYCOL 3350 17 G: 17 POWDER, FOR SOLUTION ORAL at 08:11

## 2023-06-08 RX ADMIN — MINERAL SUPPLEMENT IRON 300 MG / 5 ML STRENGTH LIQUID 100 PER BOX UNFLAVORED 300 MG: at 20:48

## 2023-06-08 RX ADMIN — GENTAMICIN SULFATE 1 DROP: 3 SOLUTION OPHTHALMIC at 20:49

## 2023-06-08 RX ADMIN — FLUTICASONE PROPIONATE 1 SPRAY: 50 SPRAY, METERED NASAL at 08:11

## 2023-06-08 RX ADMIN — Medication 15 ML: at 08:11

## 2023-06-08 RX ADMIN — LEVOTHYROXINE SODIUM 75 MCG: 0.07 TABLET ORAL at 05:39

## 2023-06-08 RX ADMIN — PIPERACILLIN AND TAZOBACTAM 3375 MG: 3; .375 INJECTION, POWDER, FOR SOLUTION INTRAVENOUS at 20:48

## 2023-06-08 RX ADMIN — IPRATROPIUM BROMIDE AND ALBUTEROL SULFATE 1 DOSE: .5; 3 SOLUTION RESPIRATORY (INHALATION) at 05:07

## 2023-06-08 RX ADMIN — MEMANTINE HYDROCHLORIDE 10 MG: 5 TABLET, FILM COATED ORAL at 08:11

## 2023-06-08 RX ADMIN — FLUOXETINE 40 MG: 20 CAPSULE ORAL at 08:12

## 2023-06-08 RX ADMIN — GENTAMICIN SULFATE 1 DROP: 3 SOLUTION OPHTHALMIC at 17:21

## 2023-06-08 RX ADMIN — PIPERACILLIN AND TAZOBACTAM 3375 MG: 3; .375 INJECTION, POWDER, FOR SOLUTION INTRAVENOUS at 15:04

## 2023-06-08 RX ADMIN — LANSOPRAZOLE 30 MG: 30 TABLET, ORALLY DISINTEGRATING, DELAYED RELEASE ORAL at 05:39

## 2023-06-08 RX ADMIN — MEMANTINE HYDROCHLORIDE 10 MG: 5 TABLET, FILM COATED ORAL at 20:49

## 2023-06-08 RX ADMIN — DONEPEZIL HYDROCHLORIDE 10 MG: 5 TABLET, FILM COATED ORAL at 20:49

## 2023-06-08 RX ADMIN — SODIUM CHLORIDE, PRESERVATIVE FREE 10 ML: 5 INJECTION INTRAVENOUS at 20:53

## 2023-06-08 RX ADMIN — ENOXAPARIN SODIUM 40 MG: 100 INJECTION SUBCUTANEOUS at 08:10

## 2023-06-08 RX ADMIN — GENTAMICIN SULFATE 1 DROP: 3 SOLUTION OPHTHALMIC at 04:03

## 2023-06-08 RX ADMIN — PIPERACILLIN AND TAZOBACTAM 3375 MG: 3; .375 INJECTION, POWDER, FOR SOLUTION INTRAVENOUS at 05:48

## 2023-06-08 RX ADMIN — GENTAMICIN SULFATE 1 DROP: 3 SOLUTION OPHTHALMIC at 15:01

## 2023-06-08 RX ADMIN — DIVALPROEX SODIUM 500 MG: 125 CAPSULE, COATED PELLETS ORAL at 20:48

## 2023-06-08 RX ADMIN — SODIUM CHLORIDE, PRESERVATIVE FREE 10 ML: 5 INJECTION INTRAVENOUS at 08:12

## 2023-06-08 RX ADMIN — GENTAMICIN SULFATE 1 DROP: 3 SOLUTION OPHTHALMIC at 08:11

## 2023-06-08 RX ADMIN — Medication 1000 UNITS: at 08:11

## 2023-06-08 RX ADMIN — DIVALPROEX SODIUM 500 MG: 125 CAPSULE, COATED PELLETS ORAL at 08:10

## 2023-06-08 RX ADMIN — ROSUVASTATIN CALCIUM 20 MG: 20 TABLET, FILM COATED ORAL at 08:11

## 2023-06-08 RX ADMIN — MINERAL SUPPLEMENT IRON 300 MG / 5 ML STRENGTH LIQUID 100 PER BOX UNFLAVORED 300 MG: at 08:11

## 2023-06-08 NOTE — PLAN OF CARE
Problem: Discharge Planning  Goal: Discharge to home or other facility with appropriate resources  Outcome: Progressing  Flowsheets (Taken 6/8/2023 0810)  Discharge to home or other facility with appropriate resources:   Identify barriers to discharge with patient and caregiver   Identify discharge learning needs (meds, wound care, etc)     Problem: Safety - Adult  Goal: Free from fall injury  Outcome: Progressing     Problem: ABCDS Injury Assessment  Goal: Absence of physical injury  Outcome: Progressing  Flowsheets (Taken 6/8/2023 0810)  Absence of Physical Injury: Implement safety measures based on patient assessment     Problem: Skin/Tissue Integrity  Goal: Absence of new skin breakdown  Description: 1. Monitor for areas of redness and/or skin breakdown  2. Assess vascular access sites hourly  3. Every 4-6 hours minimum:  Change oxygen saturation probe site  4. Every 4-6 hours:  If on nasal continuous positive airway pressure, respiratory therapy assess nares and determine need for appliance change or resting period.   Outcome: Progressing     Problem: Chronic Conditions and Co-morbidities  Goal: Patient's chronic conditions and co-morbidity symptoms are monitored and maintained or improved  Outcome: Progressing  Flowsheets (Taken 6/8/2023 0810)  Care Plan - Patient's Chronic Conditions and Co-Morbidity Symptoms are Monitored and Maintained or Improved: Monitor and assess patient's chronic conditions and comorbid symptoms for stability, deterioration, or improvement

## 2023-06-08 NOTE — FLOWSHEET NOTE
06/08/23 0810   Assessment   Charting Type Shift assessment   Psychosocial   Psychosocial (WDL) X   Patient Behaviors Flat affect   Neurological   Neuro (WDL) WDL   Level of Consciousness 0   [place]onda Coma Scale   Eye Opening 4   Best Verbal Response 4   Best Motor Response 6   [place] Coma Scale Score 14   HEENT (Head, Ears, Eyes, Nose, & Throat)   HEENT (WDL) X   Teeth Dentures upper;Dentures lower   Respiratory   Respiratory (WDL) X   Respiratory Pattern Regular   Respiratory Depth Normal   Breath Sounds   Right Upper Lobe Clear   Right Middle Lobe Diminished   Right Lower Lobe Diminished   Left Upper Lobe Clear   Left Lower Lobe Diminished   Cardiac   Cardiac (WDL) WDL   Gastrointestinal   Abdominal (WDL) WDL   Genitourinary   Genitourinary (WDL) X   Peripheral Vascular   Peripheral Vascular (WDL) X   Edema Right lower extremity; Left lower extremity   RLE Edema Trace   LLE Edema Trace   RUE Neurovascular Assessment   Capillary Refill Less than/Equal to 3 seconds   Color Pink   Temperature Warm   R Radial Pulse +2 (Moderate)   LUE Neurovascular Assessment   Capillary Refill Less than/Equal to 3 seconds   Color Pink   Temperature Warm   L Radial Pulse +2 (Moderate)   RLE Neurovascular Assessment   Color Pink   Temperature Warm   R Pedal Pulse +2   LLE Neurovascular Assessment   Color Pink   Temperature Warm   L Pedal Pulse +2   Skin Integumentary    Skin Integumentary (WDL) WDL   Care Plan - Skin/Tissue Integrity Goals   Skin Integrity Remains Intact Monitor for areas of redness and/or skin breakdown   Musculoskeletal   Musculoskeletal (WDL) X   Urinary Catheter 06/06/23   Placement Date/Time: 06/06/23 1900   Present on Admission/Arrival: No   Catheter Indications Need for fluid volume management of the critically ill patient in a critical care setting   Site Assessment Pink   Urine Color Yellow   Urine Appearance Clear   Collection Container Standard   Securement Method Securing device (Describe)   Catheter

## 2023-06-09 VITALS
RESPIRATION RATE: 16 BRPM | BODY MASS INDEX: 22.58 KG/M2 | HEART RATE: 63 BPM | DIASTOLIC BLOOD PRESSURE: 62 MMHG | HEIGHT: 64 IN | SYSTOLIC BLOOD PRESSURE: 103 MMHG | WEIGHT: 132.25 LBS | OXYGEN SATURATION: 94 % | TEMPERATURE: 98.2 F

## 2023-06-09 PROBLEM — H10.33 ACUTE CONJUNCTIVITIS OF BOTH EYES: Status: ACTIVE | Noted: 2023-06-09

## 2023-06-09 LAB
ALBUMIN SERPL-MCNC: 2 G/DL (ref 3.4–4.8)
ALBUMIN/GLOB SERPL: 0.5 {RATIO} (ref 0.8–2)
ALP SERPL-CCNC: 153 U/L (ref 25–100)
ALT SERPL-CCNC: 48 U/L (ref 4–36)
ANION GAP SERPL CALCULATED.3IONS-SCNC: 6 MMOL/L (ref 3–16)
AST SERPL-CCNC: 80 U/L (ref 8–33)
BASOPHILS # BLD: 0.1 K/UL (ref 0–0.1)
BASOPHILS NFR BLD: 0.5 %
BILIRUB SERPL-MCNC: 0.3 MG/DL (ref 0.3–1.2)
BUN SERPL-MCNC: 23 MG/DL (ref 6–20)
CALCIUM SERPL-MCNC: 8.9 MG/DL (ref 8.5–10.5)
CHLORIDE SERPL-SCNC: 98 MMOL/L (ref 98–107)
CO2 SERPL-SCNC: 36 MMOL/L (ref 20–30)
CREAT SERPL-MCNC: 0.9 MG/DL (ref 0.4–1.2)
EOSINOPHIL # BLD: 0 K/UL (ref 0–0.4)
EOSINOPHIL NFR BLD: 0.3 %
ERYTHROCYTE [DISTWIDTH] IN BLOOD BY AUTOMATED COUNT: 12.6 % (ref 11–16)
GFR SERPLBLD CREATININE-BSD FMLA CKD-EPI: >60 ML/MIN/{1.73_M2}
GLOBULIN SER CALC-MCNC: 3.7 G/DL
GLUCOSE SERPL-MCNC: 77 MG/DL (ref 74–106)
HCT VFR BLD AUTO: 40.1 % (ref 37–47)
HGB BLD-MCNC: 12.7 G/DL (ref 11.5–16.5)
IMM GRANULOCYTES # BLD: 0.3 K/UL
IMM GRANULOCYTES NFR BLD: 2.8 % (ref 0–5)
LYMPHOCYTES # BLD: 1.1 K/UL (ref 1.5–4)
LYMPHOCYTES NFR BLD: 9.8 %
MAGNESIUM SERPL-MCNC: 1.9 MG/DL (ref 1.7–2.4)
MCH RBC QN AUTO: 32.2 PG (ref 27–32)
MCHC RBC AUTO-ENTMCNC: 31.7 G/DL (ref 31–35)
MCV RBC AUTO: 101.8 FL (ref 80–100)
MONOCYTES # BLD: 0.9 K/UL (ref 0.2–0.8)
MONOCYTES NFR BLD: 7.6 %
NEUTROPHILS # BLD: 8.9 K/UL (ref 2–7.5)
NEUTS SEG NFR BLD: 79 %
PLATELET # BLD AUTO: 187 K/UL (ref 150–400)
PMV BLD AUTO: 10 FL (ref 6–10)
POTASSIUM SERPL-SCNC: 3.4 MMOL/L (ref 3.4–5.1)
PROT SERPL-MCNC: 5.7 G/DL (ref 6.4–8.3)
RBC # BLD AUTO: 3.94 M/UL (ref 3.8–5.8)
SODIUM SERPL-SCNC: 140 MMOL/L (ref 136–145)
WBC # BLD AUTO: 11.3 K/UL (ref 4–11)

## 2023-06-09 PROCEDURE — 99238 HOSP IP/OBS DSCHRG MGMT 30/<: CPT | Performed by: PHYSICIAN ASSISTANT

## 2023-06-09 PROCEDURE — 6360000002 HC RX W HCPCS: Performed by: PHYSICIAN ASSISTANT

## 2023-06-09 PROCEDURE — 6370000000 HC RX 637 (ALT 250 FOR IP): Performed by: INTERNAL MEDICINE

## 2023-06-09 PROCEDURE — 2580000003 HC RX 258: Performed by: PHYSICIAN ASSISTANT

## 2023-06-09 PROCEDURE — 83735 ASSAY OF MAGNESIUM: CPT

## 2023-06-09 PROCEDURE — 36415 COLL VENOUS BLD VENIPUNCTURE: CPT

## 2023-06-09 PROCEDURE — 97803 MED NUTRITION INDIV SUBSEQ: CPT

## 2023-06-09 PROCEDURE — 80053 COMPREHEN METABOLIC PANEL: CPT

## 2023-06-09 PROCEDURE — 85025 COMPLETE CBC W/AUTO DIFF WBC: CPT

## 2023-06-09 PROCEDURE — 2700000000 HC OXYGEN THERAPY PER DAY

## 2023-06-09 PROCEDURE — 94761 N-INVAS EAR/PLS OXIMETRY MLT: CPT

## 2023-06-09 PROCEDURE — 6370000000 HC RX 637 (ALT 250 FOR IP): Performed by: PHYSICIAN ASSISTANT

## 2023-06-09 PROCEDURE — 92526 ORAL FUNCTION THERAPY: CPT

## 2023-06-09 PROCEDURE — 6360000002 HC RX W HCPCS: Performed by: INTERNAL MEDICINE

## 2023-06-09 PROCEDURE — 2580000003 HC RX 258: Performed by: INTERNAL MEDICINE

## 2023-06-09 RX ORDER — POTASSIUM CHLORIDE 750 MG/1
40 CAPSULE, EXTENDED RELEASE ORAL ONCE
Status: COMPLETED | OUTPATIENT
Start: 2023-06-09 | End: 2023-06-09

## 2023-06-09 RX ORDER — LEVOTHYROXINE SODIUM 0.05 MG/1
75 TABLET ORAL DAILY
Qty: 30 TABLET | Refills: 3 | Status: SHIPPED | OUTPATIENT
Start: 2023-06-09

## 2023-06-09 RX ORDER — GENTAMICIN SULFATE 3 MG/ML
1 SOLUTION/ DROPS OPHTHALMIC 3 TIMES DAILY
Qty: 5 ML | Refills: 0 | Status: SHIPPED | OUTPATIENT
Start: 2023-06-09 | End: 2023-06-14

## 2023-06-09 RX ORDER — DOXYCYCLINE HYCLATE 100 MG
100 TABLET ORAL 2 TIMES DAILY
Qty: 14 TABLET | Refills: 0 | Status: SHIPPED | OUTPATIENT
Start: 2023-06-09 | End: 2023-06-16

## 2023-06-09 RX ORDER — AMOXICILLIN AND CLAVULANATE POTASSIUM 875; 125 MG/1; MG/1
1 TABLET, FILM COATED ORAL 2 TIMES DAILY
Qty: 14 TABLET | Refills: 0 | Status: SHIPPED | OUTPATIENT
Start: 2023-06-09 | End: 2023-06-16

## 2023-06-09 RX ADMIN — LEVOTHYROXINE SODIUM 75 MCG: 0.07 TABLET ORAL at 05:32

## 2023-06-09 RX ADMIN — MEMANTINE HYDROCHLORIDE 10 MG: 5 TABLET, FILM COATED ORAL at 09:45

## 2023-06-09 RX ADMIN — SODIUM CHLORIDE, PRESERVATIVE FREE 10 ML: 5 INJECTION INTRAVENOUS at 09:46

## 2023-06-09 RX ADMIN — PIPERACILLIN AND TAZOBACTAM 3375 MG: 3; .375 INJECTION, POWDER, FOR SOLUTION INTRAVENOUS at 06:10

## 2023-06-09 RX ADMIN — ENOXAPARIN SODIUM 40 MG: 100 INJECTION SUBCUTANEOUS at 09:44

## 2023-06-09 RX ADMIN — GENTAMICIN SULFATE 1 DROP: 3 SOLUTION OPHTHALMIC at 04:07

## 2023-06-09 RX ADMIN — GENTAMICIN SULFATE 1 DROP: 3 SOLUTION OPHTHALMIC at 00:20

## 2023-06-09 RX ADMIN — Medication 1000 UNITS: at 09:45

## 2023-06-09 RX ADMIN — FLUOXETINE 40 MG: 20 CAPSULE ORAL at 09:44

## 2023-06-09 RX ADMIN — Medication 15 ML: at 09:44

## 2023-06-09 RX ADMIN — DIVALPROEX SODIUM 500 MG: 125 CAPSULE, COATED PELLETS ORAL at 09:45

## 2023-06-09 RX ADMIN — ROSUVASTATIN CALCIUM 20 MG: 20 TABLET, FILM COATED ORAL at 09:45

## 2023-06-09 RX ADMIN — GENTAMICIN SULFATE 1 DROP: 3 SOLUTION OPHTHALMIC at 12:30

## 2023-06-09 RX ADMIN — GENTAMICIN SULFATE 1 DROP: 3 SOLUTION OPHTHALMIC at 09:45

## 2023-06-09 RX ADMIN — MINERAL SUPPLEMENT IRON 300 MG / 5 ML STRENGTH LIQUID 100 PER BOX UNFLAVORED 300 MG: at 09:44

## 2023-06-09 RX ADMIN — LANSOPRAZOLE 30 MG: 30 TABLET, ORALLY DISINTEGRATING, DELAYED RELEASE ORAL at 05:32

## 2023-06-09 RX ADMIN — FLUTICASONE PROPIONATE 1 SPRAY: 50 SPRAY, METERED NASAL at 09:46

## 2023-06-09 RX ADMIN — GENTAMICIN SULFATE 1 DROP: 3 SOLUTION OPHTHALMIC at 15:56

## 2023-06-09 RX ADMIN — AZITHROMYCIN MONOHYDRATE 500 MG: 250 TABLET ORAL at 00:20

## 2023-06-09 RX ADMIN — POLYETHYLENE GLYCOL 3350 17 G: 17 POWDER, FOR SOLUTION ORAL at 09:46

## 2023-06-09 RX ADMIN — POTASSIUM CHLORIDE 40 MEQ: 750 CAPSULE, EXTENDED RELEASE ORAL at 12:30

## 2023-06-09 NOTE — PLAN OF CARE
Problem: Discharge Planning  Goal: Discharge to home or other facility with appropriate resources  6/9/2023 1507 by Linn Mccall RN  Outcome: Adequate for Discharge  6/9/2023 1130 by Linn Mccall RN  Outcome: Progressing  Flowsheets (Taken 6/9/2023 0944)  Discharge to home or other facility with appropriate resources:   Identify barriers to discharge with patient and caregiver   Identify discharge learning needs (meds, wound care, etc)     Problem: Safety - Adult  Goal: Free from fall injury  6/9/2023 1507 by Linn Mccall RN  Outcome: Adequate for Discharge  6/9/2023 1130 by Linn Mccall RN  Outcome: Progressing     Problem: ABCDS Injury Assessment  Goal: Absence of physical injury  6/9/2023 1507 by Linn Mccall RN  Outcome: Adequate for Discharge  6/9/2023 1130 by Linn Mccall RN  Outcome: Progressing  Flowsheets (Taken 6/9/2023 0944)  Absence of Physical Injury: Implement safety measures based on patient assessment     Problem: Skin/Tissue Integrity  Goal: Absence of new skin breakdown  Description: 1. Monitor for areas of redness and/or skin breakdown  2. Assess vascular access sites hourly  3. Every 4-6 hours minimum:  Change oxygen saturation probe site  4. Every 4-6 hours:  If on nasal continuous positive airway pressure, respiratory therapy assess nares and determine need for appliance change or resting period.   6/9/2023 1507 by Linn Mccall RN  Outcome: Adequate for Discharge  6/9/2023 1130 by Linn Mccall RN  Outcome: Progressing     Problem: Chronic Conditions and Co-morbidities  Goal: Patient's chronic conditions and co-morbidity symptoms are monitored and maintained or improved  6/9/2023 1507 by Linn Mccall RN  Outcome: Adequate for Discharge  6/9/2023 1130 by Linn Mccall RN  Outcome: Progressing

## 2023-06-09 NOTE — DISCHARGE SUMMARY
no evidence of malignancy but suspicious for RUL infectious process and on antibiotics as below. leukocytosis improving with treatment and 11.3 prior to dc. History of breast cancer [Z85     Pneumonia          CT chest with evidence of infection tree in bud opacities RUL and consolidation concnerning for infectious process. Initially on rocephin and zithromax. With worsening leukocytosis and concern for aspiration dc'd rocephin 6/7 and started zosyn. SLP following. Ellis. Weaned o2 as tolerated and sh eis on RA prior to dc. Can use supplemental O2 prn. Leukocytosis improving and clinically improving. Recommend SLP contuinue to follow at nursing facility. Complete course of augmentin and doxycycline at nursing facility. I spoke to the patient's son Keshav Tavarez over the phone today and updated him on her case and plan for discharge today. He is agreeable to discharge back to nursing facility today. Encouraged him to discuss plan of care for Ms. Schulz Early moving forward including CODE STATUS and others in light of her worsening dementia. Of note, patient with mild transaminitis on 6/9 as below. Hold statin at this time and follow up with pcp for ongoing monitoring. Vital Signs  Temp: 98.2 °F (36.8 °C)  Pulse: 63  Respirations: 16  BP: 103/62  SpO2: 94 % on RA        Vital signs reviewed in electronic chart. Physical exam  CConstitutional:  Well developed, elderly lady lying in bed in no acute distress  Eyes:  PERRL, no scleral icterus, conjunctiva normal    HENT:  Atraumatic, external ears normal, nose normal, oropharynx moist, no pharyngeal exudates.  Neck- supple, no JVD, no lymphadenopathy  Respiratory:  No respiratory distress on 2LO2, no wheezing, rales or rhonchi detected, slightly diminished bases  Cardiovascular:  Normal rate, normal rhythm, no murmurs, no gallops, no rubs, no edema   GI:  Soft, nondistended, normal bowel sounds, nontender, no voluntary guarding   Platt catheter in

## 2023-06-09 NOTE — CARE COORDINATION
[facility] [address] [facility]. Son is agreeable. Will need transport at [place]. Nurse to call report to [phone]. DC summary to be faxed to [phone].

## 2023-06-09 NOTE — FLOWSHEET NOTE
06/08/23 2015   Assessment   Charting Type Shift assessment   Psychosocial   Psychosocial (WDL) X   Patient Behaviors Flat affect   Neurological   Neuro (WDL) WDL   Level of Consciousness 0   Shonda Coma Scale   Eye Opening 4   Best Verbal Response 4   Best Motor Response 6   [place] Coma Scale Score 14   HEENT (Head, Ears, Eyes, Nose, & Throat)   HEENT (WDL) X   Teeth Dentures upper;Dentures lower   Respiratory   Respiratory (WDL) X   Respiratory Pattern Regular   Respiratory Depth Normal   Breath Sounds   Right Upper Lobe Clear   Right Middle Lobe Diminished   Right Lower Lobe Diminished   Left Upper Lobe Clear   Left Lower Lobe Diminished   Cardiac   Cardiac (WDL) WDL   Gastrointestinal   Abdominal (WDL) WDL   Genitourinary   Genitourinary (WDL) X   Peripheral Vascular   Peripheral Vascular (WDL) X   Edema Right lower extremity; Left lower extremity   RLE Edema Trace   LLE Edema Trace   RUE Neurovascular Assessment   Capillary Refill Less than/Equal to 3 seconds   Color Pink   Temperature Warm   R Radial Pulse +2 (Moderate)   LUE Neurovascular Assessment   Capillary Refill Less than/Equal to 3 seconds   Color Pink   Temperature Warm   L Radial Pulse +2 (Moderate)   RLE Neurovascular Assessment   Color Pink   Temperature Warm   R Pedal Pulse +2   LLE Neurovascular Assessment   Color Pink   Temperature Warm   L Pedal Pulse +2   Skin Integumentary    Skin Integumentary (WDL) WDL   Musculoskeletal   Musculoskeletal (WDL) X

## 2023-06-09 NOTE — FLOWSHEET NOTE
06/09/23 0944   Assessment   Charting Type Shift assessment   Psychosocial   Psychosocial (WDL) X   Patient Behaviors Flat affect   Neurological   Neuro (WDL) WDL   Level of Consciousness 0   HEENT (Head, Ears, Eyes, Nose, & Throat)   HEENT (WDL) X   Teeth Dentures upper;Dentures lower   Respiratory   Respiratory (WDL) X   Respiratory Pattern Regular   Respiratory Depth Normal   Breath Sounds   Right Upper Lobe Clear   Right Middle Lobe Diminished   Right Lower Lobe Diminished   Left Upper Lobe Clear   Left Lower Lobe Diminished   Cardiac   Cardiac (WDL) WDL   Gastrointestinal   Abdominal (WDL) WDL   Genitourinary   Genitourinary (WDL) X   Peripheral Vascular   Peripheral Vascular (WDL) X   Edema Right lower extremity; Left lower extremity   RLE Edema Trace   LLE Edema Trace   RUE Neurovascular Assessment   Capillary Refill Less than/Equal to 3 seconds   Color Pink   Temperature Warm   R Radial Pulse +2 (Moderate)   LUE Neurovascular Assessment   Capillary Refill Less than/Equal to 3 seconds   Color Pink   Temperature Warm   L Radial Pulse +2 (Moderate)   RLE Neurovascular Assessment   Color Pink   Temperature Warm   R Pedal Pulse +2   LLE Neurovascular Assessment   Color Pink   Temperature Warm   L Pedal Pulse +2   Skin Integumentary    Skin Integumentary (WDL) WDL   Care Plan - Skin/Tissue Integrity Goals   Skin Integrity Remains Intact Monitor for areas of redness and/or skin breakdown   Musculoskeletal   Musculoskeletal (WDL) X   Care Plan - Discharge Planning Goals   Discharge to home or other facility with appropriate resources Identify barriers to discharge with patient and caregiver; Identify discharge learning needs (meds, wound care, etc)     Pt is alert and oriented. Pt is resting in bed and appears to have no acute distress. Pt currently on 2 L NC. Pt's lung sounds are clear and diminished. Pt encouraged to cough and deep breathe. Pt call bell and bedside table within reach.     2

## 2023-06-09 NOTE — PROGRESS NOTES
Called report to Rafael Ziegler at [facility]. [facility] in [facility] notified of transport need.
Comprehensive Nutrition Assessment    Type and Reason for Visit:   (Followup)    Nutrition Recommendations/Plan:   Continue current diet as medically appropriate and tolerated. Continue to encourage PO intake as appropriate. Pt intakes continue to be poor with avg of 6-25% x 5 meals. Magic Cup increased to TID to promote increased intakes. RD to follow pt and available PRN. Malnutrition Assessment:  Malnutrition Status: At risk for malnutrition (Comment) (r/t poor PO intakes and mild body fat loss) (06/07/23 1312)    Context:  Chronic Illness     Findings of the 6 clinical characteristics of malnutrition:  Energy Intake:  Mild decrease in energy intake (Comment) (1-25% x 1 meal)  Weight Loss:  Unable to assess     Body Fat Loss:  Mild body fat loss Orbital   Muscle Mass Loss:       Fluid Accumulation:  Mild Extremities   Strength:  Not Performed    Nutrition Assessment:    Pt is at high risk for ongoing nutritional compromise r/t poor PO intakes. Nutrition Related Findings:    Trace edema BLE. Medications: ferrous sulfate, liquid MVI, lansoprazole, glycolax, vitamin D, NaCl flushes. Labs: BUN 23, alb 2.0, alk phos 153, ALT 48, AST 80. Wound Type: None       Current Nutrition Intake & Therapies:    Average Meal Intake: 1-25% (6-25% x 5 meals)     ADULT DIET; Dysphagia - Minced and Moist; Low Fat/Low Chol/High Fiber/UTE; TOTAL FEED- Max cues needed  ADULT ORAL NUTRITION SUPPLEMENT; Breakfast, Dinner, Lunch; Frozen Oral Supplement    Anthropometric Measures:  Height: 5' 4\" (162.6 cm)  Ideal Body Weight (IBW): 120 lbs (55 kg)       Current Body Weight: 132 lb (59.9 kg), 110 % IBW.  Weight Source: Bed Scale  Current BMI (kg/m2): 22.6        Weight Adjustment For: No Adjustment                 BMI Categories: Normal Weight (BMI 22.0 to 24.9) age over 72    Estimated Daily Nutrient Needs:  Energy Requirements Based On: Kcal/kg  Weight Used for Energy Requirements: Current  Energy (kcal/day): 3260-7592
[facility]  SPEECH/LANGUAGE PATHOLOGY   INPATIENT DAILY NOTE      [x] Daily           [] Discharge    Patient:Christa Robles Person      :1945  IVL:[id number]  Rehab Dx/Hx: CHF (congestive heart failure), NYHA class I, acute on chronic, combined (HCC) [I50.43]   Allergies   Allergen Reactions    Hydrocodone      Precautions: Sit up for all meals and thereafter for 30 minutes, Eat with small bites (1/2 tsp; 1 tsp), Drink with a straw only with small sips, Alternate solids with liquids, Take small sips of water at the end of snacks and meals, Check mouth for food residue after snacks and meals, and Take your medication crushed with apple sauce; Restrictions/Precautions: Fall Risk, General Precautions    Home Situation/IADL:   Social/Functional History  Additional Comments: Pt is a resident of [facility]. Pt is oriented to self only and is a poor historian. Pt states she is ambulatory at the SNF. pt states she requires assistance with toileting and bathing. Pt states she is able to dress self. Pt able to self feed. Date of Admit: 2023  Room #: G102/G102-01     ST Number of Minutes/Billable Intervention  Cog/Memory Deficits     Aphasia/Language     Dysarthria/Speech     Apraxia/Speech     Dysphagia/Swallowing 17    Group     Other    TOTAL Minutes Billed  17    Variance          Date: 2023  Day of ARU Week:  3     Time in:1555  Time out: 8670  10 MIN Cotreat in:    Cotreat out-   Cotreat total-      Variance/Reason:  [] Refusal due to   [] Medical hold/reason  [] Illness   [] Off Unit for test/procedure  [] Extra time needed to complete task  [] Other (specify)    Activity completed: Therapeutic feeds with SLP: Nurse reports patient with little to no PO intake. Patient presented trials of thin liquid via straw (protein shake) and puree (pudding). Patient with no overt s/sx of aspiration on any of the trials presented. Patient needed cues for bolus acceptance via straw.  Patient needed less cues
Discharge summary faxed to [facility].
Medication Reconciliation completed with MAR from [facility].  Added mucinex prn to home med list per NH list.  Ella HarrisD
Occupational Therapy  Facility/Department: [address] [place] [address] MED SURG  Daily Treatment Note  NAME: Diane Mello  :   MRN: [id number]    Date of Service: 2023    [facility]  [address] [facility]         Patient Diagnosis(es): The primary encounter diagnosis was Hypoxia. Diagnoses of Leukocytosis, unspecified type and Pulmonary vascular congestion were also pertinent to this visit. Assessment    Assessment: Pt received supine in bed upon entry. Pt easily awakened. Pt transferred to sitting at EOB with Max x2 assist. Pt required max assist to scoot at EOB. Pt sat upright at EOB with SBA. Pt come to stand with MOD x2 assist and completed pivot transfer from bed to chair with mod x2 with RW. Pt positioned in chair and CHAKRABORTY with lunch tray. Pt required max assist to self feed. Pt required max verbal cuing for scoop assist and at times [facility] for self feeding. Pt left seated upright in recliner with alarm on and call light in reach. Family present in room upon exit. Activity Tolerance: Patient limited by fatigue;Patient tolerated treatment well;Patient limited by endurance      Plan         Restrictions       Subjective   Subjective  Subjective: Agreeable to OT services           Objective    Vitals     Transfer Training  Transfer Training: Yes  Overall Level of Assistance: Moderate assistance;Assist X2  Sit to Stand: Minimum assistance;Assist X2  Stand Pivot Transfers: Moderate assistance;Assist X2  Bed to Chair: Moderate assistance;Assist X2     ADL  Feeding: Maximum assistance; Increased time to complete;Bringing food to mouth assist;Scoop assist;Beverage management                   Goals  Short Term Goals  Time Frame for Short Term Goals: 1 week  Short Term Goal 1: Pt to complete bed mobility with MOD I. Short Term Goal 2: Pt to complete hygiene/grooming with CHAKRABORTY. Short Term Goal 3: Pt to complete ADL transfers with CGA. Short Term Goal 4: Pt to complete toileting with CGA.
Occupational Therapy  Facility/Department: [facility] MED SURG  Occupational Therapy Initial Assessment    Name: Tina Reeder  :   MRN: [id number]  Date of Service: 2023    Discharge Recommendations:  [address]          Patient Diagnosis(es): The primary encounter diagnosis was Hypoxia. Diagnoses of Leukocytosis, unspecified type and Pulmonary vascular congestion were also pertinent to this visit. Past Medical History:  has a past medical history of Dementia ([address] Depression, GERD (gastroesophageal reflux disease), Hyperlipidemia, Hypertension, and Thyroid disease. Past Surgical History:  has a past surgical history that includes Breast surgery. Assessment   Performance deficits / Impairments: Decreased strength;Decreased endurance;Decreased high-level IADLs;Decreased ADL status; Decreased cognition;Decreased posture;Decreased balance;Decreased functional mobility ; Decreased safe awareness  Assessment: This 69year old female was referred to OT services upon admission following onset of acute respiratory failure. Pt presents on 2L 02. Pt received supine in bed on 2L 02. Pt is oriented to person only. Pt is a poor historian but is aware she is a resident of SNF in [place]. Pt has difficulty following basic commands. Pt reports pain in R shoulder with mobility but no pain at rest. Pt had difficulty following commands in order to come to sit at EOB. Pt required MOD A to complete task. Pt leaning to left seated at EOB requiring occasional cues verbal and tactile to correct. Pt would not attempt combing hair upon request but washed face with CHAKRABORTY. Pt come to stand from EOB with min assist at RW. Pt unsteady and required tactile and verbal cuing to correct posture (MOD). Pt unable progress to ambulation as she reports fatigue in standing attempts. pt stood at EOB x2 trials and unable to progress activity. Pt transferred to supine with CGA.  Pt will benefit from skilled
Offered spiritual care to patient. Told pt to reach out if they needed anything further.
Physical Therapy  Facility/Department: [facility] MED SURG  Daily Treatment Note  NAME: Manasa Orlando  :   MRN: [id number]    Date of Service: 2023    Discharge Recommendations:  [address]      Patient Diagnosis(es): The primary encounter diagnosis was Hypoxia. Diagnoses of Leukocytosis, unspecified type and Pulmonary vascular congestion were also pertinent to this visit. Assessment   Assessment: Cotreated with OT. Patient transitioned supine to sit with Mod A of 2 and scooted with Max A. Stood from bedside with Mod A of 2 and transferred to the recliner with RW and Mod A of 2. Patient had slept through lunch and was ready to eat. Required assistance with feeding. Activity Tolerance: Patient tolerated treatment well;Patient limited by fatigue;Patient limited by endurance     Plan    Physical Therapy Plan  General Plan: 3-5 times per week  Days Per Week: 5 Days  Therapy Duration: 4-7 Days  Current Treatment Recommendations: Therapeutic activities; Positioning; Safety education & training;Home exercise program;Neuromuscular re-education;Gait training; Endurance training;Transfer training;Strengthening;Balance training     Restrictions  Restrictions/Precautions  Restrictions/Precautions: Fall Risk, General Precautions  Required Braces or Orthoses?: No     Subjective    Subjective  Subjective: Patient presents asleep but easily awakened and was agreeable to therapy, son and daughter-in-law present     Objective   Bed Mobility Training  Bed Mobility Training: Yes  Overall Level of Assistance: Moderate assistance;Assist X2  Rolling: Moderate assistance;Assist X2  Supine to Sit: Moderate assistance;Assist X2  Scooting: Maximum assistance  Balance  Sitting: Intact  Standing: High guard  Transfer Training  Transfer Training: Yes  Overall Level of Assistance: Moderate assistance;Assist X2  Sit to Stand:  Moderate assistance;Assist X2  Stand to Sit: Moderate assistance;Assist X2  Stand
Progress Note      Subjective:   Chief complaint: altered mental status     Interval History:   Resting in bed. She is easy to wake and answers a couple of questions but confused. Oriented to self only today. Daughter in law melody at bedside. She is on 2L O2 today. Leukocytosis improving. Daughter in law states that she hasn't been acting herself for the past couple of weeks and had a few falls and worsening confusion. The patient's sons will visit her this evening. Review of systems:   Unable to obtain     Past medical history, surgical history, family history and social history reviewed and unchanged compared to H&P earlier this admission. Medications:   Scheduled Meds:   ferrous sulfate  300 mg Oral BID    divalproex  500 mg Oral 2 times per day    CENTRUM/CERTA-MARTINE with minerals oral  15 mL Oral Daily    lansoprazole  30 mg Oral QAM AC    gentamicin  1 drop Both Eyes 6 times per day    piperacillin-tazobactam  3,375 mg IntraVENous Q8H    donepezil  10 mg Oral Nightly    FLUoxetine  40 mg Oral Daily    fluticasone  1 spray Each Nostril Daily    levothyroxine  75 mcg Oral Daily    memantine  10 mg Oral BID    polyethylene glycol  17 g Oral Daily    rosuvastatin  20 mg Oral Daily    Vitamin D  1,000 Units Oral Daily    sodium chloride flush  5-40 mL IntraVENous 2 times per day    enoxaparin  40 mg SubCUTAneous Daily    azithromycin  500 mg Oral Q24H     Continuous Infusions:   sodium chloride 5 mL/hr at 06/07/23 0045       Objective:     Vital Signs  Temp: 98.6 °F (37 °C)  Pulse: 76  Respirations: 18  BP: (!) 103/58  SpO2: 92 %  O2 Device: Nasal cannula  O2 Flow Rate (L/min): 2 L/min    Vital signs reviewed in electronic charts.     Physical exam  Constitutional:  Well developed, elderly lady lying in bed in no acute distress  Eyes:  PERRL, no scleral icterus, conjunctiva normal , small amount of yellow drainage left eye  HENT:  Atraumatic, external ears normal, nose normal, oropharynx moist, no pharyngeal
Pt arrived to floor frpm ED via stretcher. Vitals taken and recorded. Head to toe assessment done. Appropriate meds administered. HOB elevated. Call light with in reach.
Pt is stable and discharged at this time. IV was removed without complication, tip intact. Pt taken by EMS to parking [facility] and left via [facility] to [facility].
Received report from Amada-No change in am assessment
SLP ALL NOTES  Facility/Department: [facility] MED SURG   CLINICAL BEDSIDE SWALLOW EVALUATION    NAME: Karren Closs  : 1945  MRN: [id number]    ADMISSION DATE: 2023  ADMITTING DIAGNOSIS: has CHF (congestive heart failure), NYHA class I, acute on chronic, combined ([address] on their problem list.  ONSET DATE: 2023    Recent Chest Xray/CT of Chest: (2023)  FINDINGS:     There is a small left pleural effusion and mild left basilar atelectasis. There is pulmonary vascular congestion. There is no pneumothorax. The cardiomediastinal silhouette is normal. The visible bony thorax is intact. IMPRESSION:     Pulmonary vascular congestion. Small left pleural effusion. Date of Eval: 2023  Evaluating Therapist: DANIEL Mathews    Current Diet level:  Current Diet : Regular    Primary Complaint  Patient Complaint: Patient unable to voice complaints due to altered mental status    Pain:  Pain Assessment  Pain Assessment: None - Denies Pain    Reason for Referral  Karren Closs was referred for a bedside swallow evaluation to assess the efficiency of her swallow function, identify signs and symptoms of aspiration and make recommendations regarding safe dietary consistencies, effective compensatory strategies, and safe eating environment. Impression  Dysphagia Diagnosis: Mild to moderate oral stage dysphagia;Mild pharyngeal stage dysphagia  Dysphagia Impression : Patient presented with mild-mod oropharyngeal dysphagia. Dysphagia Outcome Severity Scale: Level 4: Mild moderate dysphagia- Intermittent supervision/cueing. One - two diet consistencies restricted     Treatment Plan  Requires SLP Intervention: Yes  Duration of Treatment: 2 weeks  D/C Recommendations:  To be determined  Referral To: Dietician    Recommended Diet and Intervention  Minced and moist, thin liquids, total feed, skilled ST services 1-4x per week for 2 weeks  Recommended Form of Meds: Crushed in puree as
SLP ALL NOTES  [facility] [place]  SPEECH/LANGUAGE PATHOLOGY   INPATIENT DAILY NOTE      [x] Daily           [] Discharge    Patient:Christa Zapata Person      :1945  PWB:[id number]  Rehab Dx/Hx: CHF (congestive heart failure), NYHA class I, acute on chronic, combined (HCC) [I50.43]   Allergies   Allergen Reactions    Hydrocodone      Precautions: Sit up for all meals and thereafter for 30 minutes, Eat with small bites (1/2 tsp; 1 tsp), Drink with a straw only with small sips, Alternate solids with liquids, Take small sips of water at the end of snacks and meals, Check mouth for food residue after snacks and meals, and Take your medication crushed with apple sauce; Restrictions/Precautions: Fall Risk, General Precautions    Home Situation/IADL:   Social/Functional History  Additional Comments: Pt is a resident of [facility]. Pt is oriented to self only and is a poor historian. Pt states she is ambulatory at the SNF. pt states she requires assistance with toileting and bathing. Pt states she is able to dress self. Pt able to self feed. Date of Admit: 2023  Room #: G102/G102-01     ST Number of Minutes/Billable Intervention  Cog/Memory Deficits     Aphasia/Language     Dysarthria/Speech     Apraxia/Speech     Dysphagia/Swallowing 25    Group     Other    TOTAL Minutes Billed  1725    Variance          Date: 2023  Day of ARU Week:  4     Time in: 6004  Time out: 1410  25 MIN Cotreat in:    Cotreat out-   Cotreat total-      Variance/Reason:  [] Refusal due to   [] Medical hold/reason  [] Illness   [] Off Unit for test/procedure  [] Extra time needed to complete task  [] Other (specify)    Activity completed: Therapeutic feeds with SLP. Patient consumed trials of puree texture with thin liquids via straw while upright in bed. Patient demonstrated strong cough at baseline and during tx session, however, it did not appear to be related to aspiration.  Patient required encouragement to consume PO intake as
functional        Strength RLE  Comment: gross 3+/5 poor command following  Strength LLE  Comment: gross 3+/5 poor command following        Balance  Sitting: High guard  Standing: With support  Bed mobility  Rolling to Left: Moderate assistance  Supine to Sit: Moderate assistance;2 Person assistance  Sit to Supine: Contact guard assistance  Scooting: Contact guard assistance; Moderate assistance           Balance  Sitting - Static: Fair  Sitting - Dynamic: Poor;+  Standing - Static: Poor;+  Standing - Dynamic: Poor              Goals  Short Term Goals  Time Frame for Short Term Goals: 7 days  Short Term Goal 1: Pt to receive and perform HEP while IP  Short Term Goal 2: Pt to tolerate ambulating 100' or greater with FWW CGA  Short Term Goal 3: Pt to improve standing balance by 1/2 grade       Therapy Time   Individual Concurrent Group Co-treatment   Time In  1512         Time Out  1540         Minutes  28          This note serves as a DC summary in the event of pt discharge      Gama Salinas PT

## 2023-06-09 NOTE — PLAN OF CARE
Problem: Discharge Planning  Goal: Discharge to home or other facility with appropriate resources  Outcome: Progressing  Flowsheets (Taken 6/9/2023 0944)  Discharge to home or other facility with appropriate resources:   Identify barriers to discharge with patient and caregiver   Identify discharge learning needs (meds, wound care, etc)     Problem: Safety - Adult  Goal: Free from fall injury  6/9/2023 1130 by Bbaar Oliver RN  Outcome: Progressing  6/8/2023 2206 by Margot Roque LPN  Outcome: Progressing     Problem: ABCDS Injury Assessment  Goal: Absence of physical injury  Outcome: Progressing  Flowsheets (Taken 6/9/2023 0944)  Absence of Physical Injury: Implement safety measures based on patient assessment     Problem: Skin/Tissue Integrity  Goal: Absence of new skin breakdown  Description: 1. Monitor for areas of redness and/or skin breakdown  2. Assess vascular access sites hourly  3. Every 4-6 hours minimum:  Change oxygen saturation probe site  4. Every 4-6 hours:  If on nasal continuous positive airway pressure, respiratory therapy assess nares and determine need for appliance change or resting period.   Outcome: Progressing     Problem: Chronic Conditions and Co-morbidities  Goal: Patient's chronic conditions and co-morbidity symptoms are monitored and maintained or improved  Outcome: Progressing

## 2023-06-11 LAB — BACTERIA BLD CULT: NORMAL
